# Patient Record
Sex: MALE | Race: WHITE | Employment: FULL TIME | URBAN - METROPOLITAN AREA
[De-identification: names, ages, dates, MRNs, and addresses within clinical notes are randomized per-mention and may not be internally consistent; named-entity substitution may affect disease eponyms.]

---
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
## 2018-04-19 ENCOUNTER — OFFICE VISIT (OUTPATIENT)
Dept: FAMILY MEDICINE | Facility: CLINIC | Age: 48
End: 2018-04-19
Payer: COMMERCIAL

## 2018-04-19 VITALS
HEIGHT: 74 IN | HEART RATE: 89 BPM | SYSTOLIC BLOOD PRESSURE: 124 MMHG | BODY MASS INDEX: 22.84 KG/M2 | WEIGHT: 178 LBS | TEMPERATURE: 98.6 F | DIASTOLIC BLOOD PRESSURE: 79 MMHG | OXYGEN SATURATION: 100 %

## 2018-04-19 DIAGNOSIS — E83.110 HEREDITARY HEMOCHROMATOSIS (H): ICD-10-CM

## 2018-04-19 DIAGNOSIS — Z00.00 ENCOUNTER FOR ROUTINE ADULT HEALTH EXAMINATION WITHOUT ABNORMAL FINDINGS: Primary | ICD-10-CM

## 2018-04-19 DIAGNOSIS — Z13.220 SCREENING FOR LIPID DISORDERS: ICD-10-CM

## 2018-04-19 PROBLEM — E83.10 DISORDER OF IRON METABOLISM: Status: ACTIVE | Noted: 2018-04-19

## 2018-04-19 LAB
BASOPHILS # BLD AUTO: 0 10E9/L (ref 0–0.2)
BASOPHILS NFR BLD AUTO: 0.7 %
CHOLEST SERPL-MCNC: 163 MG/DL (ref 0–200)
CHOLEST/HDLC SERPL: 2.5 {RATIO} (ref 0–5)
DIFFERENTIAL METHOD BLD: NORMAL
EOSINOPHIL # BLD AUTO: 0.1 10E9/L (ref 0–0.7)
EOSINOPHIL NFR BLD AUTO: 2.4 %
ERYTHROCYTE [DISTWIDTH] IN BLOOD BY AUTOMATED COUNT: 12.6 % (ref 10–15)
FASTING SPECIMEN: NO
FERRITIN SERPL-MCNC: 75 NG/ML (ref 26–388)
HCT VFR BLD AUTO: 47.8 % (ref 40–53)
HDLC SERPL-MCNC: 66 MG/DL
HGB BLD-MCNC: 16.4 G/DL (ref 13.3–17.7)
IMM GRANULOCYTES # BLD: 0 10E9/L (ref 0–0.4)
IMM GRANULOCYTES NFR BLD: 0 %
LDLC SERPL CALC-MCNC: 85 MG/DL (ref 0–129)
LYMPHOCYTES # BLD AUTO: 1.4 10E9/L (ref 0.8–5.3)
LYMPHOCYTES NFR BLD AUTO: 30.2 %
MCH RBC QN AUTO: 32.5 PG (ref 26.5–33)
MCHC RBC AUTO-ENTMCNC: 34.3 G/DL (ref 31.5–36.5)
MCV RBC AUTO: 95 FL (ref 78–100)
MONOCYTES # BLD AUTO: 0.3 10E9/L (ref 0–1.3)
MONOCYTES NFR BLD AUTO: 6.7 %
NEUTROPHILS # BLD AUTO: 2.8 10E9/L (ref 1.6–8.3)
NEUTROPHILS NFR BLD AUTO: 60 %
NRBC # BLD AUTO: 0 10*3/UL
NRBC BLD AUTO-RTO: 0 /100
PLATELET # BLD AUTO: 177 10E9/L (ref 150–450)
RBC # BLD AUTO: 5.05 10E12/L (ref 4.4–5.9)
TRIGL SERPL-MCNC: 59 MG/DL (ref 0–150)
VLDL-CHOLESTEROL: 12 (ref 7–32)
WBC # BLD AUTO: 4.6 10E9/L (ref 4–11)

## 2018-04-19 ASSESSMENT — PAIN SCALES - GENERAL: PAINLEVEL: NO PAIN (0)

## 2018-04-19 NOTE — NURSING NOTE
"48 year old  Chief Complaint   Patient presents with     Physical     Wants to check his iron levels, concerns about skin cancer issues, and GI issues regarding loose stools.       Blood pressure 124/79, pulse 89, temperature 98.6  F (37  C), temperature source Temporal, height 6' 1.7\" (187.2 cm), weight 178 lb (80.7 kg), SpO2 100 %. Body mass index is 23.04 kg/(m^2).  Patient Active Problem List   Diagnosis     Hereditary hemochromatosis (H)     Seasonal allergic rhinitis     Recurrent otitis externa of both ears       Wt Readings from Last 2 Encounters:   04/19/18 178 lb (80.7 kg)   10/12/16 173 lb (78.5 kg)     BP Readings from Last 3 Encounters:   04/19/18 124/79   10/12/16 132/83   11/10/14 136/77         Current Outpatient Prescriptions   Medication     B-Complex CAPS     Calcium-Magnesium-Zinc (RA CALCIUM/MAGNESIUM/ZINC) 333-133-5 MG TABS     multivitamin, therapeutic with minerals (MULTI-VITAMIN) TABS     No current facility-administered medications for this visit.        Social History   Substance Use Topics     Smoking status: Never Smoker     Smokeless tobacco: Never Used     Alcohol use Yes       Health Maintenance Due   Topic Date Due     HIV SCREEN (SYSTEM ASSIGNED)  04/13/1990     INFLUENZA VACCINE (1) 09/01/2017       No results found for: PETE Granda MA  April 19, 2018 7:53 AM    "

## 2018-04-19 NOTE — MR AVS SNAPSHOT
After Visit Summary   4/19/2018    Ino Arias    MRN: [id number]           Patient Information     Date Of Birth          1970        Visit Information        Provider Department      4/19/2018 8:00 AM Selina Hansen PA-C [facility]        Today's Diagnoses     Encounter for routine adult health examination without abnormal findings    -  1    Screening for lipid disorders        Hereditary hemochromatosis (H)          Care Instructions      Preventive Health Recommendations  Male Ages 40 to 49    Yearly exam:             See your health care provider every year in order to  o   Review health changes.   o   Discuss preventive care.    o   Review your medicines if your doctor has prescribed any.    You should be tested each year for STDs (sexually transmitted diseases) if you re at risk.     Have a cholesterol test every 5 years.     Have a colonoscopy (test for colon cancer) if someone in your family has had colon cancer or polyps before age 50.     After age 45, have a diabetes test (fasting glucose). If you are at risk for diabetes, you should have this test every 3 years.      Talk with your health care provider about whether or not a prostate cancer screening test (PSA) is right for you.    Shots: Get a flu shot each year. Get a tetanus shot every 10 years.     Nutrition:    Eat at least 5 servings of fruits and vegetables daily.     Eat whole-grain bread, whole-wheat pasta and brown rice instead of white grains and rice.     Talk to your provider about Calcium and Vitamin D.     Lifestyle    Exercise for at least 150 minutes a week (30 minutes a day, 5 days a week). This will help you control your weight and prevent disease.     Limit alcohol to one drink per day.     No smoking.     Wear sunscreen to prevent skin cancer.     See your dentist every six months for an exam and cleaning.              Follow-ups after your visit        Who to contact     Please call your clinic at  "[phone] to:    Ask questions about your health    Make or cancel appointments    Discuss your medicines    Learn about your test results    Speak to your doctor            Additional Information About Your Visit        appirisharInaika Information     [employer] gives you secure access to your electronic health record. If you see a primary care provider, you can also send messages to your care team and make appointments. If you have questions, please call your primary care clinic.  If you do not have a primary care provider, please call [phone] and they will assist you.      [employer] is an electronic gateway that provides easy, online access to your medical records. With [employer], you can request a clinic appointment, read your test results, renew a prescription or communicate with your care team.     To access your existing account, please contact your [facility] or call [phone] for assistance.        Care EveryWhere ID     This is your Care EveryWhere ID. This could be used by other organizations to access your [facility] medical records  [id number]        Your Vitals Were     Pulse Temperature Height Pulse Oximetry BMI (Body Mass Index)       89 98.6  F (37  C) (Temporal) 6' 1.7\" (187.2 cm) 100% 23.04 kg/m2        Blood Pressure from Last 3 Encounters:   04/19/18 124/79   10/12/16 132/83   11/10/14 136/77    Weight from Last 3 Encounters:   04/19/18 178 lb (80.7 kg)   10/12/16 173 lb (78.5 kg)   11/10/14 175 lb (79.4 kg)              We Performed the Following     CBC with platelets differential     Ferritin     Lipid Panel ([facility]DAQ)        Primary Care Provider Office Phone # Fax #    Zoraida Terrell [phone] [address] [facility] [address]        Equal Access to Services     TEA LANCE : Romero De La Garza, jimbo garcia, tammy flores [facility] [phone].    ATENCIÓN: " Si habla silvestre, tiene a pierre disposición servicios gratuitos de asistencia lingüística. Marii mercado [phone].    We comply with applicable federal civil rights laws and [place] laws. We do not discriminate on the basis of race, color, national origin, age, disability, sex, sexual orientation, or gender identity.            Thank you!     Thank you for choosing [facility]  for your care. Our goal is always to provide you with excellent care. Hearing back from our patients is one way we can continue to improve our services. Please take a few minutes to complete the written survey that you may receive in the mail after your visit with us. Thank you!             Your Updated Medication List - Protect others around you: Learn how to safely use, store and throw away your medicines at www.disposemymeds.org.          This list is accurate as of 4/19/18  8:38 AM.  Always use your most recent med list.                   Brand Name Dispense Instructions for use Diagnosis    B-Complex Caps     90 capsule    Take one daily        Multi-vitamin Tabs tablet      Take 1 tablet by mouth daily        RA CALCIUM/MAGNESIUM/ZINC 333-133-5 MG Tabs per tablet   Generic drug:  Calcium-Magnesium-Zinc     90 tablet    Take one po q day, also contains vitamin D

## 2018-04-19 NOTE — LETTER
April 19, 2018      Ino Arias  [address]  [place] [place] [facility] [address]              Dear Ino,    Below are your recent lab results and they look GREAT!!  Continue with a healthy diet and regular exercise.    Please let me know if you have any questions.  Thank you for allowing me to participate in your care.  It was my pleasure meeting you.      Sincerely,        Selina Hansen PA-C        Results for orders placed or performed in visit on 04/19/18   Lipid Panel (LabDAQ)   Result Value Ref Range    FASTING SPECIMEN no     Cholesterol 163.0 0.0 - 200.0    HDL Cholesterol 66.0 >40.0    Triglycerides 59.0 0.0 - 150.0    Cholesterol/HDL Ratio 2.5 0.0 - 5.0    LDL Cholesterol Direct 85.0 0.0 - 129.0    VLDL-Cholesterol 12.0 7.0 - 32.0   Ferritin   Result Value Ref Range    Ferritin 75 26 - 388 ng/mL   CBC with platelets differential   Result Value Ref Range    WBC 4.6 4.0 - 11.0 10e9/L    RBC Count 5.05 4.4 - 5.9 10e12/L    Hemoglobin 16.4 13.3 - 17.7 g/dL    Hematocrit 47.8 40.0 - 53.0 %    MCV 95 78 - 100 fl    MCH 32.5 26.5 - 33.0 pg    MCHC 34.3 31.5 - 36.5 g/dL    RDW 12.6 10.0 - 15.0 %    Platelet Count 177 150 - 450 10e9/L    Diff Method Automated Method     % Neutrophils 60.0 %    % Lymphocytes 30.2 %    % Monocytes 6.7 %    % Eosinophils 2.4 %    % Basophils 0.7 %    % Immature Granulocytes 0.0 %    Nucleated RBCs 0 0 /100    Absolute Neutrophil 2.8 1.6 - 8.3 10e9/L    Absolute Lymphocytes 1.4 0.8 - 5.3 10e9/L    Absolute Monocytes 0.3 0.0 - 1.3 10e9/L    Absolute Eosinophils 0.1 0.0 - 0.7 10e9/L    Absolute Basophils 0.0 0.0 - 0.2 10e9/L    Abs Immature Granulocytes 0.0 0 - 0.4 10e9/L    Absolute Nucleated RBC 0.0

## 2018-08-02 ENCOUNTER — OFFICE VISIT (OUTPATIENT)
Dept: FAMILY MEDICINE | Facility: CLINIC | Age: 48
End: 2018-08-02
Payer: COMMERCIAL

## 2018-08-02 VITALS
WEIGHT: 180 LBS | BODY MASS INDEX: 23.1 KG/M2 | DIASTOLIC BLOOD PRESSURE: 80 MMHG | HEART RATE: 78 BPM | HEIGHT: 74 IN | TEMPERATURE: 98.7 F | OXYGEN SATURATION: 98 % | SYSTOLIC BLOOD PRESSURE: 124 MMHG

## 2018-08-02 DIAGNOSIS — R19.7 DIARRHEA, UNSPECIFIED TYPE: Primary | ICD-10-CM

## 2018-08-02 LAB
ALBUMIN SERPL-MCNC: 4.5 G/DL (ref 3.4–5)
ALP SERPL-CCNC: 72 U/L (ref 40–150)
ALT SERPL W P-5'-P-CCNC: 18 U/L (ref 0–70)
AST SERPL W P-5'-P-CCNC: 14 U/L (ref 0–45)
BILIRUB DIRECT SERPL-MCNC: 0.2 MG/DL (ref 0–0.2)
BILIRUB SERPL-MCNC: 0.7 MG/DL (ref 0.2–1.3)
PROT SERPL-MCNC: 7.9 G/DL (ref 6.8–8.8)

## 2018-08-02 NOTE — PROGRESS NOTES
"Ino Arias is a 48 year old male here for the following issues:    Abdominal pain  He has some minor pain in the LUQ to the periumbilical region, that he reports feels like a muscle pain. He says it does not feel like a \"normal stomach ache\". He also says he can feel his stomach \"gurgling\" below the painful area. No nausea or vomiting. No fevers.    Episodic diarrhea  Since June, he has been having episodes of watery, loose stools that last for a few days, then go back to normal. He has 3 BMs per day during this time. He also reports large amounts of flatulence during the episodes. No hematochezia. No fevers, joint pain, or mouth sores. The diarrhea usually does not wake him up at night, but he was woken up with fecal urgency a couple times at the beginning of the illness. There were also a couple instances of fecal incontinence at the beginning of the illess. Diarrhea is not related to eating.   At the beginning of the illness, his glands in his neck felt swollen and he just felt unwell overall, but this has since resolved. No nausea or vomiting. No weight loss. Occasionally he will experience some heartburn. No constipation between episodes of diarrhea. He reports that the symptoms have been improving slightly over the last 2 months. No ill contacts. He cannot identify any food culprits.  He eats a lot of yogurt, and it does not make much difference. He is vegetarian. He thinks he may have a gluten or lactose sensitivity, but has never had a diagnosis. He had a CBC, ferritin, and lipid panel in April that were all normal. He has some concern about possible colon cancer. No family history of polyps or cancer. No blood in stool. No recent travel.     Patient Active Problem List   Diagnosis     Hereditary hemochromatosis (H)     Seasonal allergic rhinitis     Recurrent otitis externa of both ears     Disorder of iron metabolism       Current Outpatient Prescriptions   Medication Sig Dispense Refill     B-Complex " "CAPS Take one daily 90 capsule 3     Calcium-Magnesium-Zinc (RA CALCIUM/MAGNESIUM/ZINC) 333-133-5 MG TABS Take one po q day, also contains vitamin D 90 tablet 3     multivitamin, therapeutic with minerals (MULTI-VITAMIN) TABS Take 1 tablet by mouth daily         Allergies   Allergen Reactions     Sulfa Drugs Rash        EXAM  /80  Pulse 78  Temp 98.7  F (37.1  C) (Oral)  Ht 6' 1.7\" (187.2 cm)  Wt 180 lb (81.6 kg)  SpO2 98%  BMI 23.3 kg/m2  Gen: Alert, pleasant, NAD  Abdomen: Soft, non tender, hyperactive bowel sounds, no HSM or mass         Assessment:  (R19.7) Diarrhea, unspecified type  (primary encounter diagnosis)  Comment: episodic loose stools for the past 2 months, DDX is infection, collagenous colitis, food sensitivity, SIBO  Plan: Giardia antigen, Immunos occult blood, Enteric         Bacteria and Virus Panel by SY Stool, Ova and         Parasite Exam Routine, Clostridium difficile         toxin B PCR, Cryptosporidium in stool stain,         Hepatic panel        If nothing found on stool cultures, then refer to GI for colonoscopy with biopsy, evaluation.    Zoraida Terrell MD  Internal Medicine/Pediatrics    I, Evon Gonzalez, am serving as a scribe to document services personally performed by Dr. Zoraida Terrell, based on data collection and the provider's statements to me.     "

## 2018-08-02 NOTE — MR AVS SNAPSHOT
After Visit Summary   8/2/2018    Ino Arias    MRN: [id number]           Patient Information     Date Of Birth          1970        Visit Information        Provider Department      8/2/2018 3:00 PM Zoraida Terrell MD [facility]        Today's Diagnoses     Diarrhea, unspecified type    -  1       Follow-ups after your visit        Future tests that were ordered for you today     Open Future Orders        Priority Expected Expires Ordered    Giardia antigen Routine  9/1/2018 8/2/2018    Immunos occult blood Routine  9/1/2018 8/2/2018    Enteric Bacteria and Virus Panel by SY Stool Routine  9/1/2018 8/2/2018    Ova and Parasite Exam Routine Routine  9/1/2018 8/2/2018    Clostridium difficile toxin B PCR Routine  9/1/2018 8/2/2018    Cryptosporidium in stool stain Routine  8/2/2019 8/2/2018            Who to contact     Please call your clinic at [phone] to:    Ask questions about your health    Make or cancel appointments    Discuss your medicines    Learn about your test results    Speak to your doctor            Additional Information About Your Visit        [employer] Information     [employer] gives you secure access to your electronic health record. If you see a primary care provider, you can also send messages to your care team and make appointments. If you have questions, please call your primary care clinic.  If you do not have a primary care provider, please call [phone] and they will assist you.      [employer] is an electronic gateway that provides easy, online access to your medical records. With [employer], you can request a clinic appointment, read your test results, renew a prescription or communicate with your care team.     To access your existing account, please contact your [facility] or call [phone] for assistance.        Care EveryWhere ID     This is your Care EveryWhere ID. This could be used by other organizations to  "access your [facility] medical records  [id number]        Your Vitals Were     Pulse Temperature Height Pulse Oximetry BMI (Body Mass Index)       78 98.7  F (37.1  C) (Oral) 6' 1.7\" (187.2 cm) 98% 23.3 kg/m2        Blood Pressure from Last 3 Encounters:   08/02/18 124/80   04/19/18 124/79   10/12/16 132/83    Weight from Last 3 Encounters:   08/02/18 180 lb (81.6 kg)   04/19/18 178 lb (80.7 kg)   10/12/16 173 lb (78.5 kg)              We Performed the Following     Hepatic panel        Primary Care Provider Office Phone # Fax #    Zoraida Terrell [phone]       [address] [place] [address]        Equal Access to Services     TEA LANCE : Romero De La Garza, waroselyn garcia, qaybjosi kaalmaargelia ibarra, tammy yadav . So [facility] [phone].    ATENCIÓN: Si habla español, tiene a pierre disposición servicios gratuitos de asistencia lingüística. Marii al [phone].    We comply with applicable federal civil rights laws and [place] laws. We do not discriminate on the basis of race, color, national origin, age, disability, sex, sexual orientation, or gender identity.            Thank you!     Thank you for choosing [facility]  for your care. Our goal is always to provide you with excellent care. Hearing back from our patients is one way we can continue to improve our services. Please take a few minutes to complete the written survey that you may receive in the mail after your visit with us. Thank you!             Your Updated Medication List - Protect others around you: Learn how to safely use, store and throw away your medicines at www.disposemymeds.org.          This list is accurate as of 8/2/18  3:36 PM.  Always use your most recent med list.                   Brand Name Dispense Instructions for use Diagnosis    B-Complex Caps     90 capsule    Take one daily        Multi-vitamin Tabs tablet      Take 1 tablet by mouth daily        " RA CALCIUM/MAGNESIUM/ZINC 333-133-5 MG Tabs per tablet   Generic drug:  Calcium-Magnesium-Zinc     90 tablet    Take one po q day, also contains vitamin D

## 2018-08-02 NOTE — NURSING NOTE
"48 year old  Chief Complaint   Patient presents with     Diarrhea     for about 2 months now and come and goes.      Abdominal Pain       Blood pressure 124/80, pulse 78, temperature 98.7  F (37.1  C), temperature source Oral, height 6' 1.7\" (187.2 cm), weight 180 lb (81.6 kg), SpO2 98 %. Body mass index is 23.3 kg/(m^2).  Patient Active Problem List   Diagnosis     Hereditary hemochromatosis (H)     Seasonal allergic rhinitis     Recurrent otitis externa of both ears     Disorder of iron metabolism       Wt Readings from Last 2 Encounters:   08/02/18 180 lb (81.6 kg)   04/19/18 178 lb (80.7 kg)     BP Readings from Last 3 Encounters:   08/02/18 124/80   04/19/18 124/79   10/12/16 132/83         Current Outpatient Prescriptions   Medication     B-Complex CAPS     Calcium-Magnesium-Zinc (RA CALCIUM/MAGNESIUM/ZINC) 333-133-5 MG TABS     multivitamin, therapeutic with minerals (MULTI-VITAMIN) TABS     No current facility-administered medications for this visit.        Social History   Substance Use Topics     Smoking status: Never Smoker     Smokeless tobacco: Never Used     Alcohol use 0.6 oz/week     1 Standard drinks or equivalent per week       Health Maintenance Due   Topic Date Due     HIV SCREEN (SYSTEM ASSIGNED)  04/13/1990       No results found for: PAP      August 2, 2018 3:04 PM  "

## 2018-08-03 DIAGNOSIS — R19.7 DIARRHEA, UNSPECIFIED TYPE: ICD-10-CM

## 2018-08-03 LAB
C COLI+JEJUNI+LARI FUSA STL QL NAA+PROBE: NOT DETECTED
C DIFF TOX B STL QL: NEGATIVE
EC STX1 GENE STL QL NAA+PROBE: NOT DETECTED
EC STX2 GENE STL QL NAA+PROBE: NOT DETECTED
ENTERIC PATHOGEN COMMENT: NORMAL
NOROV GI+II ORF1-ORF2 JNC STL QL NAA+PR: NOT DETECTED
RVA NSP5 STL QL NAA+PROBE: NOT DETECTED
SALMONELLA SP RPOD STL QL NAA+PROBE: NOT DETECTED
SHIGELLA SP+EIEC IPAH STL QL NAA+PROBE: NOT DETECTED
SPECIMEN SOURCE: NORMAL
V CHOL+PARA RFBL+TRKH+TNAA STL QL NAA+PR: NOT DETECTED
Y ENTERO RECN STL QL NAA+PROBE: NOT DETECTED

## 2018-08-06 DIAGNOSIS — R19.7 DIARRHEA, UNSPECIFIED TYPE: ICD-10-CM

## 2018-08-06 LAB
G LAMBLIA AG STL QL IA: NORMAL
O+P STL CONC: NORMAL
O+P STL MICRO: NORMAL
O+P STL MICRO: NORMAL
SPECIMEN SOURCE: NORMAL

## 2018-08-07 ENCOUNTER — HOSPITAL ENCOUNTER (OUTPATIENT)
Facility: AMBULATORY SURGERY CENTER | Age: 48
End: 2018-08-07
Attending: INTERNAL MEDICINE
Payer: COMMERCIAL

## 2018-08-07 LAB
O+P STL MICRO: NORMAL
SPECIMEN SOURCE: NORMAL
SPECIMEN SOURCE: NORMAL

## 2018-08-13 ENCOUNTER — TELEPHONE (OUTPATIENT)
Dept: GASTROENTEROLOGY | Facility: CLINIC | Age: 48
End: 2018-08-13

## 2018-08-13 DIAGNOSIS — Z12.11 ENCOUNTER FOR SCREENING COLONOSCOPY: Primary | ICD-10-CM

## 2018-08-13 NOTE — TELEPHONE ENCOUNTER
Patient scheduled for colonoscopy     Indication for procedure. Diarrhea     Referring Provider. Zoraida Terrell MD    ? No     Arrival time verified? Patient to arrive at 110 pm     Facility location verified? [address] [facility], 5th floor     Instructions given regarding prep and procedure. Prep review. Transportation policy reviewed and verbalized understanding.     Prep Type Golytely.     Are you taking any anticoagulants or blood thinners? Denies     Instructions given? Yes     Electronic implanted devices? Denies     Pre procedure teaching completed? Yes    Transportation from procedure? Yes, wife     H&P / Pre op physical completed? N/A    Valdo Collado RN

## 2018-08-14 ENCOUNTER — HOSPITAL ENCOUNTER (OUTPATIENT)
Facility: AMBULATORY SURGERY CENTER | Age: 48
End: 2018-08-14
Attending: INTERNAL MEDICINE
Payer: COMMERCIAL

## 2018-08-14 ENCOUNTER — SURGERY (OUTPATIENT)
Age: 48
End: 2018-08-14

## 2018-08-14 VITALS
BODY MASS INDEX: 22.46 KG/M2 | HEART RATE: 88 BPM | TEMPERATURE: 98.2 F | HEIGHT: 74 IN | DIASTOLIC BLOOD PRESSURE: 84 MMHG | SYSTOLIC BLOOD PRESSURE: 129 MMHG | RESPIRATION RATE: 16 BRPM | WEIGHT: 175 LBS | OXYGEN SATURATION: 100 %

## 2018-08-14 LAB — COLONOSCOPY: NORMAL

## 2018-08-14 RX ORDER — FENTANYL CITRATE 50 UG/ML
INJECTION, SOLUTION INTRAMUSCULAR; INTRAVENOUS PRN
Status: DISCONTINUED | OUTPATIENT
Start: 2018-08-14 | End: 2018-08-14 | Stop reason: HOSPADM

## 2018-08-14 RX ORDER — LIDOCAINE 40 MG/G
CREAM TOPICAL
Status: DISCONTINUED | OUTPATIENT
Start: 2018-08-14 | End: 2018-08-15 | Stop reason: HOSPADM

## 2018-08-14 RX ORDER — ONDANSETRON 2 MG/ML
4 INJECTION INTRAMUSCULAR; INTRAVENOUS
Status: DISCONTINUED | OUTPATIENT
Start: 2018-08-14 | End: 2018-08-15 | Stop reason: HOSPADM

## 2018-08-14 RX ADMIN — FENTANYL CITRATE 100 MCG: 50 INJECTION, SOLUTION INTRAMUSCULAR; INTRAVENOUS at 14:08

## 2018-08-14 RX ADMIN — FENTANYL CITRATE 50 MCG: 50 INJECTION, SOLUTION INTRAMUSCULAR; INTRAVENOUS at 14:11

## 2018-08-22 LAB — COPATH REPORT: NORMAL

## 2019-10-29 ENCOUNTER — TRANSFERRED RECORDS (OUTPATIENT)
Dept: HEALTH INFORMATION MANAGEMENT | Facility: CLINIC | Age: 49
End: 2019-10-29

## 2020-03-10 ENCOUNTER — HEALTH MAINTENANCE LETTER (OUTPATIENT)
Age: 50
End: 2020-03-10

## 2020-09-17 NOTE — TELEPHONE ENCOUNTER
Left message to re-schedule pt. Currently scheduled for 8/21. Asked to see if he could come in tomorrow same MD, location and time, as Dr. Arambula is on service next Tuesday.    Marcelle Pearce RN      Numbness and Weakness of Lt side of body

## 2020-12-20 ENCOUNTER — HEALTH MAINTENANCE LETTER (OUTPATIENT)
Age: 50
End: 2020-12-20

## 2021-04-24 ENCOUNTER — HEALTH MAINTENANCE LETTER (OUTPATIENT)
Age: 51
End: 2021-04-24

## 2021-10-03 ENCOUNTER — HEALTH MAINTENANCE LETTER (OUTPATIENT)
Age: 51
End: 2021-10-03

## 2022-05-15 ENCOUNTER — HEALTH MAINTENANCE LETTER (OUTPATIENT)
Age: 52
End: 2022-05-15

## 2022-09-11 ENCOUNTER — HEALTH MAINTENANCE LETTER (OUTPATIENT)
Age: 52
End: 2022-09-11

## 2023-01-22 ENCOUNTER — HEALTH MAINTENANCE LETTER (OUTPATIENT)
Age: 53
End: 2023-01-22

## (undated) RX ORDER — FENTANYL CITRATE 50 UG/ML
INJECTION, SOLUTION INTRAMUSCULAR; INTRAVENOUS
Status: DISPENSED
Start: 2018-08-14